# Patient Record
Sex: MALE | Race: BLACK OR AFRICAN AMERICAN | NOT HISPANIC OR LATINO | URBAN - METROPOLITAN AREA
[De-identification: names, ages, dates, MRNs, and addresses within clinical notes are randomized per-mention and may not be internally consistent; named-entity substitution may affect disease eponyms.]

---

## 2017-06-11 ENCOUNTER — EMERGENCY (EMERGENCY)
Facility: HOSPITAL | Age: 42
LOS: 1 days | Discharge: HOME | End: 2017-06-11
Attending: EMERGENCY MEDICINE | Admitting: EMERGENCY MEDICINE
Payer: COMMERCIAL

## 2017-06-11 VITALS
RESPIRATION RATE: 16 BRPM | HEART RATE: 88 BPM | TEMPERATURE: 98 F | SYSTOLIC BLOOD PRESSURE: 129 MMHG | OXYGEN SATURATION: 98 % | DIASTOLIC BLOOD PRESSURE: 78 MMHG

## 2017-06-11 PROCEDURE — 99283 EMERGENCY DEPT VISIT LOW MDM: CPT

## 2017-06-11 NOTE — ED PROVIDER NOTE - PLAN OF CARE
Follow up with Opthalmology in 1-2 days [phone].   Visine A Follow up with Opthalmology in 1-2 days [phone].   Visine A as directed.  Return to the ER for any persistent/worsening or new symptoms change in vision, headache, dizziness, eye pain or any concerning symptoms.

## 2017-06-11 NOTE — ED PROVIDER NOTE - CARE PLAN
Principal Discharge DX:	Eye redness  Instructions for follow-up, activity and diet:	Follow up with Opthalmology in 1-2 days [phone].   Visine A Principal Discharge DX:	Eye redness  Instructions for follow-up, activity and diet:	Follow up with Opthalmology in 1-2 days [phone].   Visine A as directed.  Return to the ER for any persistent/worsening or new symptoms change in vision, headache, dizziness, eye pain or any concerning symptoms. Principal Discharge DX:	Eye redness  Instructions for follow-up, activity and diet:	Follow up with Opthalmology in 1-2 days [phone].   Visine A as directed.  Return to the ER for any persistent/worsening or new symptoms change in vision, headache, dizziness, eye pain or any concerning symptoms. Principal Discharge DX:	Eye redness  Instructions for follow-up, activity and diet:	Follow up with Opthalmology in 1-2 days [phone].   Visine A as directed.  Return to the ER for any persistent/worsening or new symptoms change in vision, headache, dizziness, eye pain or any concerning symptoms. Principal Discharge DX:	Eye redness  Instructions for follow-up, activity and diet:	Follow up with Opthalmology in 1-2 days [phone].   Visine A as directed.  Return to the ER for any persistent/worsening or new symptoms change in vision, headache, dizziness, eye pain or any concerning symptoms.

## 2017-06-11 NOTE — ED ADULT TRIAGE NOTE - CHIEF COMPLAINT QUOTE
Patient c/o redness, itching and pain to left eye. Pain is only on palpation. Sent in by urgent care center. PMH- GERD

## 2017-06-11 NOTE — ED PROVIDER NOTE - OBJECTIVE STATEMENT
42 y/o male with no significant PMHx presents to the ER c/o left eye redness, itchiness and pain since yesterday.  Pt reports similar episodes in the past over the past 2 years.  Pt denies change in vision, discharge, trauma, FB sensation. 42 y/o male with no significant PMHx presents to the ER c/o left eye redness, itchiness and pain since yesterday.  Pt reports similar episodes in the past over the past 2 years.  Pt denies change in vision, discharge, trauma, FB sensation.    Attending/Katharine: 40 yo M p/w eye redness, w/ mil.d pruritis, denies pain, visual changes, trauma or d/c.

## 2017-06-11 NOTE — ED PROVIDER NOTE - PHYSICAL EXAMINATION
left eye: outer sclera with mild erythema, EOMI, PERRL, no FB noted, no fluorescein uptake noted. left eye: outer sclera with mild erythema, EOMI, PERRL, no FB noted, no fluorescein uptake noted.    Attending/Katharine: Left eye sclerae with mild erythema, AC-clear, no flare or cells noted,

## 2017-11-26 ENCOUNTER — EMERGENCY (EMERGENCY)
Facility: HOSPITAL | Age: 42
LOS: 1 days | Discharge: HOME | End: 2017-11-26
Attending: EMERGENCY MEDICINE | Admitting: EMERGENCY MEDICINE
Payer: COMMERCIAL

## 2017-11-26 VITALS
SYSTOLIC BLOOD PRESSURE: 115 MMHG | HEART RATE: 73 BPM | RESPIRATION RATE: 16 BRPM | OXYGEN SATURATION: 100 % | TEMPERATURE: 98 F | DIASTOLIC BLOOD PRESSURE: 89 MMHG

## 2017-11-26 PROCEDURE — 99283 EMERGENCY DEPT VISIT LOW MDM: CPT

## 2017-11-26 RX ORDER — IBUPROFEN 200 MG
1 TABLET ORAL
Qty: 20 | Refills: 0 | OUTPATIENT
Start: 2017-11-26 | End: 2017-12-01

## 2017-11-26 RX ORDER — IBUPROFEN 200 MG
400 TABLET ORAL ONCE
Qty: 0 | Refills: 0 | Status: COMPLETED | OUTPATIENT
Start: 2017-11-26 | End: 2017-11-26

## 2017-11-26 RX ORDER — METOCLOPRAMIDE HCL 10 MG
10 TABLET ORAL ONCE
Qty: 0 | Refills: 0 | Status: COMPLETED | OUTPATIENT
Start: 2017-11-26 | End: 2017-11-26

## 2017-11-26 RX ADMIN — Medication 400 MILLIGRAM(S): at 11:35

## 2017-11-26 NOTE — ED PROVIDER NOTE - OBJECTIVE STATEMENT
Case of a 43 y/o male patient with no past medical history presenting to the ED with headache since yesterday. patient states that he has been having this kind of pain for a couple of months now. He describes the pain as a pressure in his left side involving his eye. Denies any nausea, vomits or diarrhea.

## 2017-11-26 NOTE — ED PROVIDER NOTE - CARE PLAN
Principal Discharge DX:	Headache  Instructions for follow-up, activity and diet:	Follow up with neurologist as per episodes of migraines.

## 2017-11-26 NOTE — ED PROVIDER NOTE - PROGRESS NOTE DETAILS
Patient revaluated and feeling better, refused reglan medication and states that he is ready to go home.

## 2018-05-29 ENCOUNTER — EMERGENCY (EMERGENCY)
Facility: HOSPITAL | Age: 43
LOS: 1 days | Discharge: HOME | End: 2018-05-29
Attending: EMERGENCY MEDICINE | Admitting: EMERGENCY MEDICINE
Payer: COMMERCIAL

## 2018-05-29 VITALS
SYSTOLIC BLOOD PRESSURE: 123 MMHG | HEART RATE: 84 BPM | TEMPERATURE: 98 F | DIASTOLIC BLOOD PRESSURE: 88 MMHG | OXYGEN SATURATION: 99 % | RESPIRATION RATE: 16 BRPM

## 2018-05-29 LAB
APPEARANCE UR: CLEAR — SIGNIFICANT CHANGE UP
BILIRUB UR-MCNC: NEGATIVE — SIGNIFICANT CHANGE UP
BLOOD UR QL VISUAL: NEGATIVE — SIGNIFICANT CHANGE UP
COLOR SPEC: YELLOW — SIGNIFICANT CHANGE UP
GLUCOSE UR-MCNC: NEGATIVE — SIGNIFICANT CHANGE UP
KETONES UR-MCNC: NEGATIVE — SIGNIFICANT CHANGE UP
LEUKOCYTE ESTERASE UR-ACNC: NEGATIVE — SIGNIFICANT CHANGE UP
MUCOUS THREADS # UR AUTO: SIGNIFICANT CHANGE UP
NITRITE UR-MCNC: NEGATIVE — SIGNIFICANT CHANGE UP
PH UR: 6 — SIGNIFICANT CHANGE UP (ref 4.6–8)
PROT UR-MCNC: 10 MG/DL — SIGNIFICANT CHANGE UP
RBC CASTS # UR COMP ASSIST: SIGNIFICANT CHANGE UP (ref 0–?)
SP GR SPEC: 1.02 — SIGNIFICANT CHANGE UP (ref 1–1.04)
UROBILINOGEN FLD QL: NORMAL MG/DL — SIGNIFICANT CHANGE UP
WBC UR QL: SIGNIFICANT CHANGE UP (ref 0–?)

## 2018-05-29 PROCEDURE — 99284 EMERGENCY DEPT VISIT MOD MDM: CPT

## 2018-05-29 RX ORDER — OXYCODONE HYDROCHLORIDE 5 MG/1
1 TABLET ORAL
Qty: 10 | Refills: 0 | OUTPATIENT
Start: 2018-05-29 | End: 2018-05-31

## 2018-05-29 RX ORDER — OXYCODONE AND ACETAMINOPHEN 5; 325 MG/1; MG/1
1 TABLET ORAL ONCE
Qty: 0 | Refills: 0 | Status: DISCONTINUED | OUTPATIENT
Start: 2018-05-29 | End: 2018-05-29

## 2018-05-29 RX ORDER — LIDOCAINE 4 G/100G
1 CREAM TOPICAL ONCE
Qty: 0 | Refills: 0 | Status: COMPLETED | OUTPATIENT
Start: 2018-05-29 | End: 2018-05-29

## 2018-05-29 RX ORDER — IBUPROFEN 200 MG
600 TABLET ORAL ONCE
Qty: 0 | Refills: 0 | Status: COMPLETED | OUTPATIENT
Start: 2018-05-29 | End: 2018-05-29

## 2018-05-29 RX ADMIN — Medication 600 MILLIGRAM(S): at 11:48

## 2018-05-29 RX ADMIN — OXYCODONE AND ACETAMINOPHEN 1 TABLET(S): 5; 325 TABLET ORAL at 11:07

## 2018-05-29 RX ADMIN — OXYCODONE AND ACETAMINOPHEN 1 TABLET(S): 5; 325 TABLET ORAL at 11:48

## 2018-05-29 RX ADMIN — Medication 600 MILLIGRAM(S): at 11:06

## 2018-05-29 RX ADMIN — LIDOCAINE 1 PATCH: 4 CREAM TOPICAL at 11:06

## 2018-05-29 NOTE — ED ADULT TRIAGE NOTE - CHIEF COMPLAINT QUOTE
Pt complaining of lower back pain since last night, worsening this AM. Pt denies recent injury, ambulatory at triage.

## 2018-05-29 NOTE — ED PROVIDER NOTE - CARE PLAN
Principal Discharge DX:	Acute bilateral low back pain without sciatica  Assessment and plan of treatment:	Please take Motrin for pain (400-600mg every 6-8 hours), take percocet for severe pain.  Please follow up with your primary physician as scheduled.  Return to the ER for increased pain, fever, weakness, trouble urinating or any other concern.

## 2018-05-29 NOTE — ED PROVIDER NOTE - CHPI ED SYMPTOMS NEG
no motor function loss/no abd pain, no urinary sx, no fever, no chills/no numbness/no bladder dysfunction/no tingling/no bowel dysfunction

## 2018-05-29 NOTE — ED PROVIDER NOTE - PLAN OF CARE
none known Please take Motrin for pain (400-600mg every 6-8 hours), take percocet for severe pain.  Please follow up with your primary physician as scheduled.  Return to the ER for increased pain, fever, weakness, trouble urinating or any other concern.

## 2018-05-29 NOTE — ED PROVIDER NOTE - NS_ ATTENDINGSCRIBEDETAILS _ED_A_ED_FT
The scribe's documentation has been prepared under my direction and personally reviewed by me in its entirety. I confirm that the note above accurately reflects all work, treatment, procedures, and medical decision making performed by me. PRIYA Mercedes MD

## 2018-05-29 NOTE — ED PROVIDER NOTE - PROGRESS NOTE DETAILS
Pain unchanged with medication, however able to get an appointment with his internist so would prefer to be discharged for evaluation in clinic. UA negative, unlikely kidney stone. Will d/c home. PRIYA Mercedes MD

## 2018-05-29 NOTE — ED PROVIDER NOTE - MEDICAL DECISION MAKING DETAILS
41 y/o M w/ atraumatic b/l lower back pain, worse w/ movement. No focal neuro deficits or red flag sx concerning for cord compression. Likely MSK pain. Pain radiating to scrotum w/o any testicular abnormalities. Will also consider kidney stone. Plan: UA, pain control and reevaluate.

## 2018-05-29 NOTE — ED PROVIDER NOTE - OBJECTIVE STATEMENT
43 y/o M w/ PMhx of kidney stone, presents to the ED c/o atraumatic b/l lower back pain since last night, worse since this morning. Pt reports pain radiates to the scrotum. Pain is worse w/ movement. Endorses use of 400mg Motrin and Flexeril this morning w/ no relief. No prior hx of similar sx. Pt admits to hx of kidney stone. Denies trauma, fall, abd pain, numbness/tingling, weakness, urinary/bowel dysfunction, urinary sx, fever, chills or any other complaints.

## 2019-10-11 NOTE — ED PROVIDER NOTE - NS CPE EDP EYE EXAM BOTH DETAIL
[FreeTextEntry1] : This is a 59 y/o woman with no significant PMH presents for evaluation of joint swelling in left 2nd DIP which occurred 3 weeks ago.  It was red, swollen, and tender.  It lasted about 3 weeks, and is essentially resolved.  She denies any recent viral syndrome/cold.\par \par She went 10 years ago to a rheumatologist for same joints, and was told to take fish oil.  No other treatment.  \par She denies any AM stiffness.  \par Her current pain level is 1/10 in intensity.  \par \par Reports frequent abd bloating\par Denies any eye inflammation, scleritis/iritis/uveitis.  \par Denies hx of kidney stone.\par Denies personal or fam hx of Psoriasis, IBD\par She has pertinent fam hx of arthritis in aunt   uncorrected

## 2021-08-04 NOTE — ED PROVIDER NOTE - CPE EDP HEAD NORM PED
Class II - visualization of the soft palate, fauces, and uvula Head atraumatic, normal cephalic shape.

## 2023-05-08 PROBLEM — Z00.00 ENCOUNTER FOR PREVENTIVE HEALTH EXAMINATION: Status: ACTIVE | Noted: 2023-05-08

## 2023-05-09 PROBLEM — N20.0 CALCULUS OF KIDNEY: Chronic | Status: ACTIVE | Noted: 2018-05-29

## 2023-05-22 ENCOUNTER — APPOINTMENT (OUTPATIENT)
Dept: ORTHOPEDIC SURGERY | Facility: CLINIC | Age: 48
End: 2023-05-22
Payer: COMMERCIAL

## 2023-05-22 VITALS — HEIGHT: 73 IN | WEIGHT: 205 LBS | BODY MASS INDEX: 27.17 KG/M2

## 2023-05-22 DIAGNOSIS — M25.551 PAIN IN RIGHT HIP: ICD-10-CM

## 2023-05-22 DIAGNOSIS — M70.61 TROCHANTERIC BURSITIS, RIGHT HIP: ICD-10-CM

## 2023-05-22 DIAGNOSIS — E78.00 PURE HYPERCHOLESTEROLEMIA, UNSPECIFIED: ICD-10-CM

## 2023-05-22 DIAGNOSIS — I10 ESSENTIAL (PRIMARY) HYPERTENSION: ICD-10-CM

## 2023-05-22 DIAGNOSIS — M51.36 OTHER INTERVERTEBRAL DISC DEGENERATION, LUMBAR REGION: ICD-10-CM

## 2023-05-22 PROCEDURE — 73502 X-RAY EXAM HIP UNI 2-3 VIEWS: CPT

## 2023-05-22 PROCEDURE — 99204 OFFICE O/P NEW MOD 45 MIN: CPT | Mod: 25

## 2023-05-22 PROCEDURE — 99214 OFFICE O/P EST MOD 30 MIN: CPT | Mod: 25

## 2023-05-22 PROCEDURE — 72100 X-RAY EXAM L-S SPINE 2/3 VWS: CPT

## 2023-05-22 PROCEDURE — 20610 DRAIN/INJ JOINT/BURSA W/O US: CPT | Mod: RT

## 2023-05-22 NOTE — PROCEDURE
[Large Joint Injection] : Large joint injection [Right] : of the right [Greater Trochanteric Bursa] : greater trochanteric bursa [Pain] : pain [Alcohol] : alcohol [Betadine] : betadine [Ethyl Chloride sprayed topically] : ethyl chloride sprayed topically [Sterile technique used] : sterile technique used [___ cc    3mg] :  Betamethasone (Celestone) ~Vcc of 3mg [___ cc    1%] : Lidocaine ~Vcc of 1%

## 2023-05-22 NOTE — PHYSICAL EXAM
[Disc space narrowing] : Disc space narrowing [] : no swelling [Right] : right hip with pelvis [AP] : anteroposterior [Lateral] : lateral [There are no fractures, subluxations or dislocations. No significant abnormalities are seen] : There are no fractures, subluxations or dislocations. No significant abnormalities are seen

## 2023-05-22 NOTE — HISTORY OF PRESENT ILLNESS
[Nothing helps with pain getting better] : Nothing helps with pain getting better [de-identified] : Has had issues with hip pain in the past, has had CSI years ago. Had an issue while swimming, aggravated right hip and groin. No low back pain at present.  [] : no [FreeTextEntry1] : RT Hip [FreeTextEntry5] : RT Hip Hx of Bursitis would like a CSI today. Pt was treated by Dr Mckeon in the past.

## 2023-06-05 ENCOUNTER — TRANSCRIPTION ENCOUNTER (OUTPATIENT)
Age: 48
End: 2023-06-05

## 2023-07-03 ENCOUNTER — APPOINTMENT (OUTPATIENT)
Dept: ORTHOPEDIC SURGERY | Facility: CLINIC | Age: 48
End: 2023-07-03

## 2023-11-03 ENCOUNTER — APPOINTMENT (OUTPATIENT)
Dept: ORTHOPEDIC SURGERY | Facility: CLINIC | Age: 48
End: 2023-11-03
Payer: COMMERCIAL

## 2023-11-03 VITALS — WEIGHT: 195 LBS | BODY MASS INDEX: 25.84 KG/M2 | HEIGHT: 73 IN

## 2023-11-03 PROCEDURE — 99214 OFFICE O/P EST MOD 30 MIN: CPT | Mod: 25

## 2023-11-03 PROCEDURE — 99204 OFFICE O/P NEW MOD 45 MIN: CPT | Mod: 25

## 2023-11-03 PROCEDURE — 73562 X-RAY EXAM OF KNEE 3: CPT | Mod: RT

## 2023-11-03 PROCEDURE — 20610 DRAIN/INJ JOINT/BURSA W/O US: CPT | Mod: RT

## 2023-12-01 ENCOUNTER — APPOINTMENT (OUTPATIENT)
Dept: ORTHOPEDIC SURGERY | Facility: CLINIC | Age: 48
End: 2023-12-01

## 2024-01-19 ENCOUNTER — APPOINTMENT (OUTPATIENT)
Dept: ORTHOPEDIC SURGERY | Facility: CLINIC | Age: 49
End: 2024-01-19
Payer: COMMERCIAL

## 2024-01-19 VITALS — WEIGHT: 195 LBS | BODY MASS INDEX: 25.84 KG/M2 | HEIGHT: 73 IN

## 2024-01-19 DIAGNOSIS — M23.91 UNSPECIFIED INTERNAL DERANGEMENT OF RIGHT KNEE: ICD-10-CM

## 2024-01-19 DIAGNOSIS — M25.461 EFFUSION, RIGHT KNEE: ICD-10-CM

## 2024-01-19 PROCEDURE — 73562 X-RAY EXAM OF KNEE 3: CPT | Mod: RT

## 2024-01-19 PROCEDURE — 99213 OFFICE O/P EST LOW 20 MIN: CPT
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
## 2024-01-19 NOTE — IMAGING
[Right] : right knee [Lateral] : lateral [Mild tricompartmental OA medial narrowing] : Mild tricompartmental OA medial narrowing

## 2024-01-19 NOTE — WORK
[Sprain/Strain] : sprain/strain [Was the competent medical cause of the injury] : was the competent medical cause of the injury [Are consistent with the injury] : are consistent with the injury [Consistent with my objective findings] : consistent with my objective findings [Mild Partial] : mild partial [Reveals pre-existing condition(s) that may affect treatment/prognosis] : reveals pre-existing condition(s) that may affect treatment/prognosis [Can return to work without limitations on ______] : can return to work without limitations on [unfilled] [N/A] : : Not Applicable [Patient] : patient [No] : No [No Rx restrictions] : No Rx restrictions. [I provided the services listed above] :  I provided the services listed above. [Light Work:] : Light Work: Exerting up to 20 pounds of force occasionally, and/or up to 10 pounds of force frequently and/or negligible amount of force constantly to move objects. Physical demand requirements are in excess of those for Sedentary Work. Even though the weight lifted may only be a negligible amount, a job should be rated Light Work: (1) when it requires walking or standing to a significant degree: or (2) when it requires sitting most of the time but entails pushing and/or pulling of arm or leg controls: and/or (3) when the job requires working at a production rate pace entailing the constant pushing and/or pulling of materials even though the weight of those materials is negligible. NOTE: The constant stress of maintaining a production rate pace, especially in an industrial setting, can be and is physically demanding of a worker even though the amount of force exerted is negligible. [FreeTextEntry1] : fair [FreeTextEntry2] : OA knee

## 2024-01-19 NOTE — PHYSICAL EXAM
[Right] : right knee [5___] : hamstring 5[unfilled]/5 [Positive] : positive Sara [] : negative Lachmann [TWNoteComboBox7] : flexion 115 degrees [de-identified] : extension 3 degrees

## 2024-01-23 ENCOUNTER — APPOINTMENT (OUTPATIENT)
Dept: MRI IMAGING | Facility: CLINIC | Age: 49
End: 2024-01-23
Payer: COMMERCIAL

## 2024-01-23 PROCEDURE — 73721 MRI JNT OF LWR EXTRE W/O DYE: CPT | Mod: RT

## 2024-01-30 ENCOUNTER — APPOINTMENT (OUTPATIENT)
Dept: ORTHOPEDIC SURGERY | Facility: CLINIC | Age: 49
End: 2024-01-30
Payer: COMMERCIAL

## 2024-01-30 VITALS — HEIGHT: 73 IN | WEIGHT: 195 LBS | BODY MASS INDEX: 25.84 KG/M2

## 2024-01-30 DIAGNOSIS — M17.11 UNILATERAL PRIMARY OSTEOARTHRITIS, RIGHT KNEE: ICD-10-CM

## 2024-01-30 DIAGNOSIS — M94.261 CHONDROMALACIA, RIGHT KNEE: ICD-10-CM

## 2024-01-30 PROCEDURE — 99213 OFFICE O/P EST LOW 20 MIN: CPT
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
## 2024-01-30 NOTE — PHYSICAL EXAM
[Right] : right knee [5___] : hamstring 5[unfilled]/5 [] : negative Lachmann [Positive] : positive Sara [TWNoteComboBox7] : flexion 115 degrees [de-identified] : extension 3 degrees

## 2024-01-30 NOTE — ASSESSMENT
[FreeTextEntry1] : Quad exercises Knee sleeve Discussed possible viscoinjection series through [employer], he will think about it

## 2024-01-30 NOTE — WORK
[Sprain/Strain] : sprain/strain [Was the competent medical cause of the injury] : was the competent medical cause of the injury [Are consistent with the injury] : are consistent with the injury [Consistent with my objective findings] : consistent with my objective findings [Mild Partial] : mild partial [Reveals pre-existing condition(s) that may affect treatment/prognosis] : reveals pre-existing condition(s) that may affect treatment/prognosis [Can return to work without limitations on ______] : can return to work without limitations on [unfilled] [N/A] : : Not Applicable [Patient] : patient [No] : No [No Rx restrictions] : No Rx restrictions. [I provided the services listed above] :  I provided the services listed above. [FreeTextEntry1] : fair [FreeTextEntry2] : OA knee [Light Work:] : Light Work: Exerting up to 20 pounds of force occasionally, and/or up to 10 pounds of force frequently and/or negligible amount of force constantly to move objects. Physical demand requirements are in excess of those for Sedentary Work. Even though the weight lifted may only be a negligible amount, a job should be rated Light Work: (1) when it requires walking or standing to a significant degree: or (2) when it requires sitting most of the time but entails pushing and/or pulling of arm or leg controls: and/or (3) when the job requires working at a production rate pace entailing the constant pushing and/or pulling of materials even though the weight of those materials is negligible. NOTE: The constant stress of maintaining a production rate pace, especially in an industrial setting, can be and is physically demanding of a worker even though the amount of force exerted is negligible. English

## 2024-07-25 ENCOUNTER — APPOINTMENT (OUTPATIENT)
Dept: ORTHOPEDIC SURGERY | Facility: CLINIC | Age: 49
End: 2024-07-25

## 2024-07-25 VITALS — WEIGHT: 200 LBS | BODY MASS INDEX: 26.51 KG/M2 | HEIGHT: 73 IN

## 2024-07-25 DIAGNOSIS — M70.61 TROCHANTERIC BURSITIS, RIGHT HIP: ICD-10-CM

## 2024-07-25 PROCEDURE — 20610 DRAIN/INJ JOINT/BURSA W/O US: CPT | Mod: RT

## 2024-07-25 PROCEDURE — 99213 OFFICE O/P EST LOW 20 MIN: CPT | Mod: 25

## 2024-07-25 RX ORDER — SITAGLIPTIN 100 MG/1
TABLET, FILM COATED ORAL
Refills: 0 | Status: ACTIVE | COMMUNITY

## 2024-07-25 RX ORDER — LISINOPRIL 30 MG/1
TABLET ORAL
Refills: 0 | Status: ACTIVE | COMMUNITY

## 2024-07-25 NOTE — HISTORY OF PRESENT ILLNESS
[Nothing helps with pain getting better] : Nothing helps with pain getting better [de-identified] : Has had issues with hip pain in the past, has had CSI years ago. Had an issue while swimming, aggravated right hip and groin. No low back pain at present.  [] : no [FreeTextEntry1] : RT Hip [FreeTextEntry5] : RT Hip Hx of Bursitis would like a CSI today. Pt was treated by Dr Mckeon in the past.

## 2024-07-25 NOTE — PHYSICAL EXAM
[Disc space narrowing] : Disc space narrowing [] : groin pain with resisted straight leg raise [Right] : right hip with pelvis [AP] : anteroposterior [Lateral] : lateral [There are no fractures, subluxations or dislocations. No significant abnormalities are seen] : There are no fractures, subluxations or dislocations. No significant abnormalities are seen

## 2024-09-02 ENCOUNTER — APPOINTMENT (OUTPATIENT)
Dept: ORTHOPEDIC SURGERY | Facility: CLINIC | Age: 49
End: 2024-09-02
Payer: COMMERCIAL

## 2024-09-02 VITALS — BODY MASS INDEX: 26.51 KG/M2 | HEIGHT: 73 IN | WEIGHT: 200 LBS

## 2024-09-02 DIAGNOSIS — M54.50 LOW BACK PAIN, UNSPECIFIED: ICD-10-CM

## 2024-09-02 PROCEDURE — 72100 X-RAY EXAM L-S SPINE 2/3 VWS: CPT

## 2024-09-02 PROCEDURE — 99213 OFFICE O/P EST LOW 20 MIN: CPT

## 2024-09-02 PROCEDURE — 72170 X-RAY EXAM OF PELVIS: CPT

## 2024-09-02 RX ORDER — PREDNISONE 20 MG/1
20 TABLET ORAL
Qty: 18 | Refills: 0 | Status: ACTIVE | COMMUNITY
Start: 2024-09-02 | End: 1900-01-01

## 2024-09-02 RX ORDER — CYCLOBENZAPRINE HYDROCHLORIDE 5 MG/1
5 TABLET, FILM COATED ORAL 3 TIMES DAILY
Qty: 30 | Refills: 3 | Status: ACTIVE | COMMUNITY
Start: 2024-09-02 | End: 1900-01-01

## 2024-09-02 NOTE — ASSESSMENT
[FreeTextEntry1] : The patient was advised of the diagnosis. The natural history of the pathology was explained in full to the patient in layman's terms. All questions were answered. The risks and benefits of surgical and non-surgical treatment alternatives were explained in full to the patient.  pt provided LSO for comfort. Prednisone taper pack / Flexeril 5 mg tid x 10 days. PT rx provided.  Heat compress qid. RTO in 1-2 weeks for f/u care.

## 2024-09-02 NOTE — IMAGING
[de-identified] : Thoracic / Lumbar spine visually: no changes Gait examination: pt ambulating with cane Pt is able to heel and toe walk. Shopping Cart Sign: negative TTP: diffuse paralumbar ttp symmetrically Percussion of T/L spine: negative ROM: limited in all planes Facet Joint Compression Tests: negative Sacroiliac joints: nttp Bilateral hip ROM: full and pain free ROM Sensation:  wnls DTRs:  pt is guarding Clonus: negative Babinski Test: negative Seated SLR Test: negative Hamstrings: limber   Lumbar 2 view xray with pelvis: loss of lordosis with no acute bony pathology.

## 2024-09-02 NOTE — HISTORY OF PRESENT ILLNESS
[Lower back] : lower back [Dull/Aching] : dull/aching [Localized] : localized [Tightness] : tightness [de-identified] : 09/02/2024: 49 yr old male c/o lower back pain that worsen last night after exercising doing weighted squats. had been treated by Dr. Garcia before incident, Hx of lumbar pain.  Pt denies radicular symptoms or b/b dysfunction.  PMH: Hyperlipidemia, htn. Allergies: NKDA

## 2024-09-16 ENCOUNTER — APPOINTMENT (OUTPATIENT)
Dept: ORTHOPEDIC SURGERY | Facility: CLINIC | Age: 49
End: 2024-09-16
Payer: COMMERCIAL

## 2024-09-16 DIAGNOSIS — M51.36 OTHER INTERVERTEBRAL DISC DEGENERATION, LUMBAR REGION: ICD-10-CM

## 2024-09-16 PROCEDURE — 99204 OFFICE O/P NEW MOD 45 MIN: CPT

## 2024-09-16 PROCEDURE — 99214 OFFICE O/P EST MOD 30 MIN: CPT

## 2024-09-16 NOTE — DISCUSSION/SUMMARY
[Medication Risks Reviewed] : Medication risks reviewed [de-identified] : reviewed the case and the imaging with the patient  lower back pain - likely disc herniation discussion of the condition and treatment options cautions discussed questions answered discussion of natural history of the condition and what the next step would be PT/exercise MRi L spine if not getting better

## 2024-09-16 NOTE — HISTORY OF PRESENT ILLNESS
[de-identified] : 09/16/2024:49 year old male presents today with recent history of acute low back pain after working out at the gym squatting 115lbs. patient has increased low back pain and spasm, went to [facility] received course of prednisone taper with significant relief.   Patient has residual pain with sitting for extended time. Denies radicular pain, n/t.  Denies change in b/b function.  Has Hx of low back pain, under the care of Dr. Garcia, received TPI with relief. last injection was in May No pain management   PmHx: HTN, hld NKDA Occupation:   xrays reviewed: L spine - mild spondylosis AP PELVIS - negative    [FreeTextEntry1] : lower back pain

## 2025-08-25 ENCOUNTER — APPOINTMENT (OUTPATIENT)
Dept: ORTHOPEDIC SURGERY | Facility: CLINIC | Age: 50
End: 2025-08-25

## 2025-08-25 DIAGNOSIS — M25.551 PAIN IN RIGHT HIP: ICD-10-CM

## 2025-08-25 PROCEDURE — 72170 X-RAY EXAM OF PELVIS: CPT

## 2025-08-25 PROCEDURE — 99214 OFFICE O/P EST MOD 30 MIN: CPT

## 2025-08-25 PROCEDURE — 72100 X-RAY EXAM L-S SPINE 2/3 VWS: CPT

## 2025-08-25 RX ORDER — CYCLOBENZAPRINE 10 MG/1
10 TABLET ORAL 3 TIMES DAILY
Qty: 30 | Refills: 3 | Status: ACTIVE | COMMUNITY
Start: 2025-08-25 | End: 1900-01-01

## 2025-08-25 RX ORDER — MELOXICAM 15 MG/1
15 TABLET ORAL DAILY
Qty: 30 | Refills: 2 | Status: ACTIVE | COMMUNITY
Start: 2025-08-25 | End: 1900-01-01

## 2025-08-27 ENCOUNTER — NON-APPOINTMENT (OUTPATIENT)
Age: 50
End: 2025-08-27

## 2025-08-27 ENCOUNTER — APPOINTMENT (OUTPATIENT)
Dept: SURGERY | Facility: CLINIC | Age: 50
End: 2025-08-27
Payer: COMMERCIAL

## 2025-08-27 VITALS
WEIGHT: 195 LBS | HEART RATE: 76 BPM | HEIGHT: 72 IN | TEMPERATURE: 98 F | DIASTOLIC BLOOD PRESSURE: 86 MMHG | OXYGEN SATURATION: 99 % | SYSTOLIC BLOOD PRESSURE: 130 MMHG | BODY MASS INDEX: 26.41 KG/M2

## 2025-08-27 PROCEDURE — 99205 OFFICE O/P NEW HI 60 MIN: CPT

## 2025-08-29 ENCOUNTER — APPOINTMENT (OUTPATIENT)
Dept: MRI IMAGING | Facility: CLINIC | Age: 50
End: 2025-08-29

## 2025-08-29 ENCOUNTER — NON-APPOINTMENT (OUTPATIENT)
Age: 50
End: 2025-08-29

## 2025-08-29 PROCEDURE — 73721 MRI JNT OF LWR EXTRE W/O DYE: CPT | Mod: RT

## 2025-08-29 PROCEDURE — 72148 MRI LUMBAR SPINE W/O DYE: CPT

## 2025-09-03 ENCOUNTER — APPOINTMENT (OUTPATIENT)
Dept: MRI IMAGING | Facility: IMAGING CENTER | Age: 50
End: 2025-09-03

## 2025-09-05 ENCOUNTER — APPOINTMENT (OUTPATIENT)
Dept: ORTHOPEDIC SURGERY | Facility: CLINIC | Age: 50
End: 2025-09-05
Payer: COMMERCIAL

## 2025-09-05 DIAGNOSIS — G89.29 LUMBAGO WITH SCIATICA, RIGHT SIDE: ICD-10-CM

## 2025-09-05 DIAGNOSIS — M51.369: ICD-10-CM

## 2025-09-05 DIAGNOSIS — M54.41 LUMBAGO WITH SCIATICA, RIGHT SIDE: ICD-10-CM

## 2025-09-05 DIAGNOSIS — S73.191D OTHER SPRAIN OF RIGHT HIP, SUBSEQUENT ENCOUNTER: ICD-10-CM

## 2025-09-05 PROCEDURE — 99213 OFFICE O/P EST LOW 20 MIN: CPT
